# Patient Record
Sex: MALE | Race: WHITE | URBAN - METROPOLITAN AREA
[De-identification: names, ages, dates, MRNs, and addresses within clinical notes are randomized per-mention and may not be internally consistent; named-entity substitution may affect disease eponyms.]

---

## 2018-02-20 NOTE — PATIENT DISCUSSION
Reviewed updates on  corneal transplants with patient (his grandmother had a full thickness corneal transplant)

## 2019-02-18 NOTE — PATIENT DISCUSSION
FUCH'S ENDOTHELIAL DYSTROPHY,OU  MILD GUTTATA AND NO EDEMA. USE SHAKIRA OINTMENT OR DROPS AS DIRECTED. FOLLOW.

## 2019-10-02 ENCOUNTER — IMPORTED ENCOUNTER (OUTPATIENT)
Dept: URBAN - METROPOLITAN AREA CLINIC 50 | Facility: CLINIC | Age: 59
End: 2019-10-02

## 2019-11-01 ENCOUNTER — IMPORTED ENCOUNTER (OUTPATIENT)
Dept: URBAN - METROPOLITAN AREA CLINIC 50 | Facility: CLINIC | Age: 59
End: 2019-11-01

## 2019-11-06 ENCOUNTER — IMPORTED ENCOUNTER (OUTPATIENT)
Dept: URBAN - METROPOLITAN AREA CLINIC 50 | Facility: CLINIC | Age: 59
End: 2019-11-06

## 2019-11-07 ENCOUNTER — IMPORTED ENCOUNTER (OUTPATIENT)
Dept: URBAN - METROPOLITAN AREA CLINIC 50 | Facility: CLINIC | Age: 59
End: 2019-11-07

## 2019-11-11 ENCOUNTER — IMPORTED ENCOUNTER (OUTPATIENT)
Dept: URBAN - METROPOLITAN AREA CLINIC 50 | Facility: CLINIC | Age: 59
End: 2019-11-11

## 2019-11-19 ENCOUNTER — IMPORTED ENCOUNTER (OUTPATIENT)
Dept: URBAN - METROPOLITAN AREA CLINIC 50 | Facility: CLINIC | Age: 59
End: 2019-11-19

## 2019-11-19 PROBLEM — Z96.1: Noted: 2019-11-19

## 2019-11-19 NOTE — PATIENT DISCUSSION
"""S/P IOL OD: Tecnis ZCB00 +23.50 (Target: Distance) +Femto/Arcs +Omidria.  Continue post operative instructions and drops per schedule. "" ""Continue Pred-Moxi-Nepaf right eye three times a day

## 2019-11-25 ENCOUNTER — IMPORTED ENCOUNTER (OUTPATIENT)
Dept: URBAN - METROPOLITAN AREA CLINIC 50 | Facility: CLINIC | Age: 59
End: 2019-11-25

## 2019-11-25 NOTE — PATIENT DISCUSSION
"""S/P IOL OD: Tecnis ZCB00 +23.50 (Target: Distance) +Femto/Arcs +Omidria. Continue post operative instructions and drops per schedule.  """

## 2019-12-23 ENCOUNTER — IMPORTED ENCOUNTER (OUTPATIENT)
Dept: URBAN - METROPOLITAN AREA CLINIC 50 | Facility: CLINIC | Age: 59
End: 2019-12-23

## 2020-03-02 ENCOUNTER — IMPORTED ENCOUNTER (OUTPATIENT)
Dept: URBAN - METROPOLITAN AREA CLINIC 50 | Facility: CLINIC | Age: 60
End: 2020-03-02

## 2020-09-17 NOTE — PATIENT DISCUSSION
FUCH'S ENDOTHELIAL DYSTROPHY,OU:  ENDOTHELIAL CELL COUNT PERFORMED TODAY. IT IS BELIEVED THE RESULTS ARE THE BEST THE TECH WAS ABLE TO ACHIEVE. PATIENT WILL RECEIVE RESULTS AT THEIR NEXT SCHEDULED APPOINTMENT.

## 2021-04-17 ASSESSMENT — VISUAL ACUITY
OD_SC: 20/25
OD_CC: J1+
OD_PH: 20/40
OS_BAT: 20/30
OD_OTHER: 20/25. 20/25.
OD_SC: 20/25
OS_BAT: 20/30
OD_CC: J3@ 16 IN
OS_CC: J3@ 16 IN
OD_BAT: 20/40
OD_OTHER: 20/25. 20/50.
OS_CC: 20/20-2
OS_OTHER: 20/30. 20/50.
OS_OTHER: 20/25. 20/25.
OS_OTHER: 20/25. 20/25.
OD_SC: 20/60-1
OS_BAT: 20/25
OD_BAT: 20/40
OD_OTHER: 20/40. 20/200.
OS_OTHER: 20/30. 20/40.
OD_CC: 20/20
OD_CC: J1+
OS_CC: J1+
OS_SC: 20/40-1
OS_SC: 20/40
OD_SC: 20/100
OD_BAT: 20/25
OS_PH: 20/30+
OD_CC: 20/50
OS_CC: J1+
OS_BAT: 20/25
OD_BAT: 20/25
OS_PH: 20/25
OD_OTHER: 20/40. 20/200.
OD_PH: 20/50
OS_CC: 20/20

## 2021-04-17 ASSESSMENT — TONOMETRY
OS_IOP_MMHG: 12
OS_IOP_MMHG: 13
OD_IOP_MMHG: 16
OD_IOP_MMHG: 14
OD_IOP_MMHG: 15
OD_IOP_MMHG: 15
OD_IOP_MMHG: 24
OS_IOP_MMHG: 14
OS_IOP_MMHG: 15
OD_IOP_MMHG: 15
OS_IOP_MMHG: 13
OS_IOP_MMHG: 14
OS_IOP_MMHG: 16

## 2021-04-17 ASSESSMENT — PACHYMETRY
OS_CT_UM: 559
OS_CT_UM: 559
OD_CT_UM: 549
OS_CT_UM: 559
OD_CT_UM: 549
OS_CT_UM: 559
OD_CT_UM: 549
OS_CT_UM: 559

## 2021-07-26 ENCOUNTER — PREPPED CHART (OUTPATIENT)
Dept: URBAN - METROPOLITAN AREA CLINIC 52 | Facility: CLINIC | Age: 61
End: 2021-07-26

## 2021-07-28 ENCOUNTER — ANNUAL COMPREHENSIVE EXAM (OUTPATIENT)
Dept: URBAN - METROPOLITAN AREA CLINIC 52 | Facility: CLINIC | Age: 61
End: 2021-07-28

## 2021-07-28 DIAGNOSIS — H26.493: ICD-10-CM

## 2021-07-28 DIAGNOSIS — Z79.4: ICD-10-CM

## 2021-07-28 DIAGNOSIS — H52.4: ICD-10-CM

## 2021-07-28 DIAGNOSIS — H35.373: ICD-10-CM

## 2021-07-28 DIAGNOSIS — E11.3292: ICD-10-CM

## 2021-07-28 DIAGNOSIS — H35.363: ICD-10-CM

## 2021-07-28 DIAGNOSIS — Z01.01: ICD-10-CM

## 2021-07-28 DIAGNOSIS — H11.151: ICD-10-CM

## 2021-07-28 PROCEDURE — 92015 DETERMINE REFRACTIVE STATE: CPT

## 2021-07-28 PROCEDURE — 92134 CPTRZ OPH DX IMG PST SGM RTA: CPT

## 2021-07-28 PROCEDURE — 92014 COMPRE OPH EXAM EST PT 1/>: CPT

## 2021-07-28 ASSESSMENT — VISUAL ACUITY
OU_CC: J1+ @ 13IN
OS_SC: 20/40+1
OD_CC: 20/20
OS_GLARE: 20/20
OS_PH: 20/25
OS_GLARE: 20/20
OU_CC: 20/20-1
OD_GLARE: 20/30
OD_SC: 20/25+1
OD_GLARE: 20/25
OS_CC: 20/20

## 2021-07-28 ASSESSMENT — KERATOMETRY
OS_K1POWER_DIOPTERS: 43.50
OD_AXISANGLE_DEGREES: 178
OD_AXISANGLE2_DEGREES: 88
OS_K2POWER_DIOPTERS: 44.50
OD_K2POWER_DIOPTERS: 44.00
OD_K1POWER_DIOPTERS: 43.25
OS_AXISANGLE_DEGREES: 94
OS_AXISANGLE2_DEGREES: 4

## 2021-07-28 ASSESSMENT — TONOMETRY
OD_IOP_MMHG: 10
OS_IOP_MMHG: 11

## 2021-07-28 NOTE — PATIENT DISCUSSION
NPDR: The patient has clinical evidence of non-proliferative diabetic retinopathy; appears stable. It was explained to the patient that disease progression is common and repeat examination within 12 months to monitor for progression was advised. The patient was encouraged to continue to manage their weight, diet, exercise, blood pressure and blood glucose. The patient should continue to follow up with their primary care physician and work to optimally control their Hgb A1c.

## 2021-11-04 ENCOUNTER — PROBLEM (OUTPATIENT)
Dept: URBAN - METROPOLITAN AREA CLINIC 48 | Facility: CLINIC | Age: 61
End: 2021-11-04

## 2021-11-04 DIAGNOSIS — H10.13: ICD-10-CM

## 2021-11-04 PROCEDURE — 92012 INTRM OPH EXAM EST PATIENT: CPT

## 2021-11-04 RX ORDER — FLUOROMETHOLONE 1 MG/ML: 1 SUSPENSION/ DROPS OPHTHALMIC TWICE A DAY

## 2021-11-04 ASSESSMENT — KERATOMETRY
OS_K2POWER_DIOPTERS: 44.50
OS_AXISANGLE_DEGREES: 94
OD_AXISANGLE2_DEGREES: 88
OD_K1POWER_DIOPTERS: 43.25
OS_K1POWER_DIOPTERS: 43.50
OD_K2POWER_DIOPTERS: 44.00
OS_AXISANGLE2_DEGREES: 4
OD_AXISANGLE_DEGREES: 178

## 2021-11-04 ASSESSMENT — VISUAL ACUITY
OD_CC: 20/25+1
OS_CC: 20/20-1
OU_CC: 20/20-1

## 2021-11-04 ASSESSMENT — TONOMETRY
OS_IOP_MMHG: 14
OD_IOP_MMHG: 14

## 2022-07-07 NOTE — PATIENT DISCUSSION
Patient returns to clinic for specular microscopy testing only. Patients results will be discussed with him at next scheduled appointment.

## 2022-07-26 NOTE — PATIENT DISCUSSION
Discussed options of rock inhibitor trial. Patient elects to proceed, start Rhopressa QHS OU. Warned of eye redness that occurs when first starting drop.

## 2022-07-26 NOTE — PATIENT DISCUSSION
Mild guttata noted, no edema. Use Mirna ointment nightly or drops 2-3x daily as directed. Specular microscopy testing to be documented every 1-3 years.

## 2022-08-04 ENCOUNTER — ESTABLISHED PATIENT (OUTPATIENT)
Dept: URBAN - METROPOLITAN AREA CLINIC 50 | Facility: CLINIC | Age: 62
End: 2022-08-04

## 2022-08-04 DIAGNOSIS — H35.363: ICD-10-CM

## 2022-08-04 DIAGNOSIS — E10.3293: ICD-10-CM

## 2022-08-04 DIAGNOSIS — Z01.01: ICD-10-CM

## 2022-08-04 PROCEDURE — 92134 CPTRZ OPH DX IMG PST SGM RTA: CPT

## 2022-08-04 PROCEDURE — 92014 COMPRE OPH EXAM EST PT 1/>: CPT

## 2022-08-04 PROCEDURE — 92015 DETERMINE REFRACTIVE STATE: CPT

## 2022-08-04 ASSESSMENT — VISUAL ACUITY
OU_CC: 20/20-1
OD_GLARE: 20/40
OU_CC: J1@16"
OS_CC: 20/20-2
OS_GLARE: 20/25
OD_CC: 20/20-1
OD_GLARE: 20/30
OS_GLARE: 20/50

## 2022-08-04 ASSESSMENT — KERATOMETRY
OD_K2POWER_DIOPTERS: 44.25
OS_AXISANGLE_DEGREES: 086
OS_AXISANGLE2_DEGREES: 176
OD_AXISANGLE2_DEGREES: 84
OD_AXISANGLE_DEGREES: 174
OS_K1POWER_DIOPTERS: 44.25
OD_K1POWER_DIOPTERS: 43.25
OS_K2POWER_DIOPTERS: 45.00

## 2022-08-04 ASSESSMENT — TONOMETRY
OD_IOP_MMHG: 10
OS_IOP_MMHG: 10